# Patient Record
Sex: FEMALE | Race: WHITE | Employment: FULL TIME | URBAN - METROPOLITAN AREA
[De-identification: names, ages, dates, MRNs, and addresses within clinical notes are randomized per-mention and may not be internally consistent; named-entity substitution may affect disease eponyms.]

---

## 2021-02-05 ENCOUNTER — OFFICE VISIT (OUTPATIENT)
Dept: PRIMARY CARE CLINIC | Age: 46
End: 2021-02-05
Payer: COMMERCIAL

## 2021-02-05 VITALS
HEIGHT: 66 IN | DIASTOLIC BLOOD PRESSURE: 73 MMHG | HEART RATE: 80 BPM | TEMPERATURE: 98.2 F | WEIGHT: 289.8 LBS | OXYGEN SATURATION: 98 % | SYSTOLIC BLOOD PRESSURE: 145 MMHG | BODY MASS INDEX: 46.57 KG/M2

## 2021-02-05 DIAGNOSIS — E66.01 MORBID OBESITY WITH BMI OF 45.0-49.9, ADULT (HCC): ICD-10-CM

## 2021-02-05 DIAGNOSIS — Z13.21 ENCOUNTER FOR VITAMIN DEFICIENCY SCREENING: ICD-10-CM

## 2021-02-05 DIAGNOSIS — Z76.89 ENCOUNTER TO ESTABLISH CARE: Primary | ICD-10-CM

## 2021-02-05 DIAGNOSIS — Z00.01 ANNUAL VISIT FOR GENERAL ADULT MEDICAL EXAMINATION WITH ABNORMAL FINDINGS: ICD-10-CM

## 2021-02-05 DIAGNOSIS — Z23 ENCOUNTER FOR IMMUNIZATION: ICD-10-CM

## 2021-02-05 DIAGNOSIS — Z11.4 SCREENING FOR HIV (HUMAN IMMUNODEFICIENCY VIRUS): ICD-10-CM

## 2021-02-05 DIAGNOSIS — R07.9 CHEST PAIN, UNSPECIFIED TYPE: ICD-10-CM

## 2021-02-05 DIAGNOSIS — C92.01 AML (ACUTE MYELOID LEUKEMIA) IN REMISSION (HCC): ICD-10-CM

## 2021-02-05 DIAGNOSIS — Z12.31 ENCOUNTER FOR SCREENING MAMMOGRAM FOR MALIGNANT NEOPLASM OF BREAST: ICD-10-CM

## 2021-02-05 DIAGNOSIS — R03.0 ELEVATED BP WITHOUT DIAGNOSIS OF HYPERTENSION: ICD-10-CM

## 2021-02-05 DIAGNOSIS — Z11.59 NEED FOR HEPATITIS C SCREENING TEST: ICD-10-CM

## 2021-02-05 PROBLEM — Z87.891 HISTORY OF TOBACCO USE: Status: ACTIVE | Noted: 2021-02-05

## 2021-02-05 PROCEDURE — 99204 OFFICE O/P NEW MOD 45 MIN: CPT | Performed by: FAMILY MEDICINE

## 2021-02-05 PROCEDURE — 90715 TDAP VACCINE 7 YRS/> IM: CPT | Performed by: FAMILY MEDICINE

## 2021-02-05 PROCEDURE — 99386 PREV VISIT NEW AGE 40-64: CPT | Performed by: FAMILY MEDICINE

## 2021-02-05 PROCEDURE — 96160 PT-FOCUSED HLTH RISK ASSMT: CPT | Performed by: FAMILY MEDICINE

## 2021-02-05 PROCEDURE — 90471 IMMUNIZATION ADMIN: CPT | Performed by: FAMILY MEDICINE

## 2021-02-05 RX ORDER — AA/PROT/LYSINE/METHIO/VIT C/B6 50-12.5 MG
10 TABLET ORAL DAILY
COMMUNITY

## 2021-02-05 RX ORDER — UREA 10 %
100 LOTION (ML) TOPICAL DAILY
COMMUNITY

## 2021-02-05 NOTE — PATIENT INSTRUCTIONS
Starting a Weight Loss Plan: Care Instructions Your Care Instructions If you are thinking about losing weight, it can be hard to know where to start. Your doctor can help you set up a weight loss plan that best meets your needs. You may want to take a class on nutrition or exercise, or join a weight loss support group. If you have questions about how to make changes to your eating or exercise habits, ask your doctor about seeing a registered dietitian or an exercise specialist. 
It can be a big challenge to lose weight. But you do not have to make huge changes at once. Make small changes, and stick with them. When those changes become habit, add a few more changes. If you do not think you are ready to make changes right now, try to pick a date in the future. Make an appointment to see your doctor to discuss whether the time is right for you to start a plan. Follow-up care is a key part of your treatment and safety. Be sure to make and go to all appointments, and call your doctor if you are having problems. It's also a good idea to know your test results and keep a list of the medicines you take. How can you care for yourself at home? · Set realistic goals. Many people expect to lose much more weight than is likely. A weight loss of 5% to 10% of your body weight may be enough to improve your health. · Get family and friends involved to provide support. Talk to them about why you are trying to lose weight, and ask them to help. They can help by participating in exercise and having meals with you, even if they may be eating something different. · Find what works best for you. If you do not have time or do not like to cook, a program that offers meal replacement bars or shakes may be better for you. Or if you like to prepare meals, finding a plan that includes daily menus and recipes may be best. 
· Ask your doctor about other health professionals who can help you achieve your weight loss goals. ? A dietitian can help you make healthy changes in your diet. ? An exercise specialist or  can help you develop a safe and effective exercise program. 
? A counselor or psychiatrist can help you cope with issues such as depression, anxiety, or family problems that can make it hard to focus on weight loss. · Consider joining a support group for people who are trying to lose weight. Your doctor can suggest groups in your area. Where can you learn more? Go to http://www.gray.com/ Enter V479 in the search box to learn more about \"Starting a Weight Loss Plan: Care Instructions. \" Current as of: December 11, 2019               Content Version: 12.6 © 6685-9635 [employer]. Care instructions adapted under license by [employer] (which disclaims liability or warranty for this information). If you have questions about a medical condition or this instruction, always ask your healthcare professional. [address] any warranty or liability for your use of this information.

## 2021-02-05 NOTE — PROGRESS NOTES
Many concerns. Blood work, would like to check sugar levels. Cancer survivor. Having some chest discomfort at times. Would like info weight lose.

## 2021-02-05 NOTE — PROGRESS NOTES
HPI     Chief Complaint: establish care and has multiple complaints. Rebekah Gentile is an 41 y.o. female. Patient previously was seen here but has been several years since she has had follow-up. She wants to reestablish care and and wants to catch up on health maintenance including her annual physical.      Acute issues  Chest pain, intermittent and stabbing in nature. Randomly happens. Goes away after 15-20 minutes. Not associated with diaphoresis. Can exercise without chest pain. Patient also wants to have labs checked. 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 She states she has been cleared by hematology after close follow-up for 5 years. She has no reported fatigue. She is concerned for diabetes. Would like to have labs checked. She does report eating a lot of sugar. She is concerned about her weight. Recently started exercising. Diet and exercise: Exercises by doing the treadmill a few times a week. Diet \"bad\". Admits a lot of sugar. Gyn Care overdue  She has never had a mammogram.      Health Maintenance reviewed -  HIV testing discussed today  Hepatitis C testing discussed today  Last Mammogram: never  Last TDap: unknown    Social History  Denies smoking currently. Quit November 2018. Smoked 1ppd for 30 years. EtOH rarely. No elicit drug use     Family History  No FH of breast cancer  No FH of colon cancer     Current medications include:   Current Outpatient Medications   Medication Sig    fish oil-omega-3 fatty acids 340-1,000 mg capsule Take 1 Cap by mouth daily.  therapeutic multivitamin-minerals (THERAGRAN-M) tablet Take 1 Tab by mouth daily.  cyanocobalamin (Vitamin B-12) 100 mcg tablet Take 100 mcg by mouth daily.  docusate sodium (COLACE) 50 mg capsule Take 50 mg by mouth daily.  coenzyme q10 10 mg cap Take 10 mg by mouth daily.  loratadine (CLARITIN REDITABS) 10 mg dissolvable tablet Take 10 mg by mouth daily. No current facility-administered medications for this visit. Allergies - reviewed: Allergies   Allergen Reactions    Prednisone Anxiety         Past Medical History - reviewed:  Past Medical History:   Diagnosis Date    Cancer ([address]     Headache          Past Surgical History - reviewed:  History reviewed. No pertinent surgical history. Family History - reviewed:  History reviewed. No pertinent family history. Social History - reviewed:  Social History     Socioeconomic History    Marital status:      Spouse name: Not on file    Number of children: Not on file    Years of education: Not on file    Highest education level: Not on file   Occupational History    Not on file   Social Needs    Financial resource strain: Not on file    Food insecurity     Worry: Not on file     Inability: Not on file    Transportation needs     Medical: Not on file     Non-medical: Not on file   Tobacco Use    Smoking status: Former Smoker     Start date: 2018    Smokeless tobacco: Never Used   Substance and Sexual Activity    Alcohol use: No    Drug use: No    Sexual activity: Not on file   Lifestyle    Physical activity     Days per week: Not on file     Minutes per session: Not on file    Stress: Not on file   Relationships    Social connections     Talks on phone: Not on file     Gets together: Not on file     Attends Synagogue service: Not on file     Active member of club or organization: Not on file     Attends meetings of clubs or organizations: Not on file     Relationship status: Not on file    Intimate partner violence     Fear of current or ex partner: Not on file     Emotionally abused: Not on file     Physically abused: Not on file     Forced sexual activity: Not on file   Other Topics Concern    Not on file   Social History Narrative    Not on file         Immunizations - reviewed:    There is no immunization history for the selected administration types on file for this patient. Review of Systems   Review of Systems   Constitutional: Negative for chills and fever. HENT: Negative for congestion and sore throat. Eyes: Negative for blurred vision and redness. Respiratory: Negative for cough and shortness of breath. Cardiovascular: Positive for chest pain. Negative for palpitations and leg swelling. Gastrointestinal: Negative for abdominal pain, blood in stool and vomiting. Genitourinary: Negative for dysuria and hematuria. Musculoskeletal: Negative for falls and myalgias. Skin: Negative for rash. Neurological: Negative for dizziness, focal weakness and headaches. Endo/Heme/Allergies: Negative for polydipsia. Does not bruise/bleed easily. Psychiatric/Behavioral: Negative for substance abuse and suicidal ideas. Reviewed PmHx, FmHx, SocHx as well as meds and allergies, updated and dated in the chart. Objective     Visit Vitals  BP (!) 145/73 (BP 1 Location: Left upper arm, BP Patient Position: Sitting, BP Cuff Size: Large adult)   Pulse 80   Temp 98.2 °F (36.8 °C) (Temporal)   Ht 5' 6\" (1.676 m)   Wt 289 lb 12.8 oz (131.5 kg)   SpO2 98%   BMI 46.77 kg/m²       Physical Exam  Vitals signs and nursing note reviewed. Constitutional:       General: She is not in acute distress. Appearance: Normal appearance. HENT:      Head: Normocephalic and atraumatic. Right Ear: External ear normal.      Left Ear: External ear normal.      Nose: Nose normal. No congestion. Mouth/Throat:      Mouth: Mucous membranes are moist.      Pharynx: No oropharyngeal exudate or posterior oropharyngeal erythema. Eyes:      Extraocular Movements: Extraocular movements intact. Conjunctiva/sclera: Conjunctivae normal.      Pupils: Pupils are equal, round, and reactive to light. Neck:      Musculoskeletal: Normal range of motion and neck supple. Cardiovascular:      Rate and Rhythm: Normal rate and regular rhythm. Pulses: Normal pulses.       Heart sounds: Normal heart sounds. No murmur. Comments: No tenderness to palpation of anterior chest wall. Pulmonary:      Effort: Pulmonary effort is normal. No respiratory distress. Breath sounds: Normal breath sounds. No rales. Abdominal:      General: There is no distension. Palpations: Abdomen is soft. Musculoskeletal: Normal range of motion. Skin:     General: Skin is warm and dry. Coloration: Skin is not jaundiced. Neurological:      General: No focal deficit present. Mental Status: She is alert and oriented to person, place, and time. Psychiatric:         Mood and Affect: Mood normal.         Behavior: Behavior normal.     EKG: Personally reviewed. Normal sinus rhythm without any ST changes. Assessment and Plan     Diagnoses and all orders for this visit:    1. Encounter to establish care  -     CBC WITH AUTOMATED DIFF  -     METABOLIC PANEL, COMPREHENSIVE  -     HEMOGLOBIN A1C WITH EAG  -     SC PT-FOCUSED HLTH RISK ASSMT SCORE DOC STND INSTRM    2. Annual visit for general adult medical examination with abnormal findings  Comments:  Catching up HM. Will return for fasting labs and pap smear. 3. Chest pain, unspecified type  Comments:  EKG NSR. Referring to Cardiology and checking TSH. Orders:  -     AMB POC EKG ROUTINE W/ 12 LEADS, INTER & REP  -     TSH RFX ON ABNORMAL TO FREE T4  -     REFERRAL TO CARDIOLOGY    4. Morbid obesity with BMI of 45.0-49.9, adult ([address]  Comments:  Counseled on healthy diet and lifestyle  Orders:  -     TSH RFX ON ABNORMAL TO FREE T4  -     METABOLIC PANEL, COMPREHENSIVE  -     HEMOGLOBIN A1C WITH EAG    5. AML (acute myeloid leukemia) in remission [facility])  Comments: In remission per patient. Orders:  -     CBC WITH AUTOMATED DIFF    6. Elevated BP without diagnosis of hypertension  Comments:  Elevated today. Will monitor at next visit. Start antihypertensive if needed.      7. Encounter for screening mammogram for malignant neoplasm of breast  -     [facility] MAMMO BI SCREENING INCL CAD; Future    8. Need for hepatitis C screening test  -     HEPATITIS C AB    9. Screening for HIV (human immunodeficiency virus)  -     HIV 1/2 AG/AB, 4TH GENERATION,W RFLX CONFIRM    10. Encounter for vitamin deficiency screening  -     VITAMIN D, 25 HYDROXY    11. Encounter for immunization  -     TETANUS, DIPHTHERIA TOXOIDS AND ACELLULAR PERTUSSIS VACCINE (TDAP), IN INDIVIDS. >=7, IM         Follow-up and Dispositions    · Return in about 1 month (around 3/5/2021) for Pap smear. Follow-up and Disposition History          I discussed the aforementioned diagnoses with the patient as well as the plan of care. All questions were answered and an AVS was provided.        Benito Campos MD  [facility]  [id number], [place], [address] [place] [address]

## 2021-02-06 LAB
25(OH)D3+25(OH)D2 SERPL-MCNC: 25.1 NG/ML (ref 30–100)
ALBUMIN SERPL-MCNC: 3.7 G/DL (ref 3.8–4.8)
ALBUMIN/GLOB SERPL: 1.4 {RATIO} (ref 1.2–2.2)
ALP SERPL-CCNC: 83 IU/L (ref 39–117)
ALT SERPL-CCNC: 17 IU/L (ref 0–32)
AST SERPL-CCNC: 12 IU/L (ref 0–40)
BASOPHILS # BLD AUTO: 0.1 X10E3/UL (ref 0–0.2)
BASOPHILS NFR BLD AUTO: 1 %
BILIRUB SERPL-MCNC: <0.2 MG/DL (ref 0–1.2)
BUN SERPL-MCNC: 14 MG/DL (ref 6–24)
BUN/CREAT SERPL: 16 (ref 9–23)
CALCIUM SERPL-MCNC: 8.9 MG/DL (ref 8.7–10.2)
CHLORIDE SERPL-SCNC: 105 MMOL/L (ref 96–106)
CO2 SERPL-SCNC: 26 MMOL/L (ref 20–29)
CREAT SERPL-MCNC: 0.85 MG/DL (ref 0.57–1)
EOSINOPHIL # BLD AUTO: 0.5 X10E3/UL (ref 0–0.4)
EOSINOPHIL NFR BLD AUTO: 5 %
ERYTHROCYTE [DISTWIDTH] IN BLOOD BY AUTOMATED COUNT: 13.1 % (ref 11.7–15.4)
EST. AVERAGE GLUCOSE BLD GHB EST-MCNC: 128 MG/DL
GLOBULIN SER CALC-MCNC: 2.6 G/DL (ref 1.5–4.5)
GLUCOSE SERPL-MCNC: 93 MG/DL (ref 65–99)
HBA1C MFR BLD: 6.1 % (ref 4.8–5.6)
HCT VFR BLD AUTO: 37.1 % (ref 34–46.6)
HCV AB S/CO SERPL IA: <0.1 S/CO RATIO (ref 0–0.9)
HGB BLD-MCNC: 12.9 G/DL (ref 11.1–15.9)
HIV 1+2 AB+HIV1 P24 AG SERPL QL IA: NON REACTIVE
IMM GRANULOCYTES # BLD AUTO: 0 X10E3/UL (ref 0–0.1)
IMM GRANULOCYTES NFR BLD AUTO: 0 %
LYMPHOCYTES # BLD AUTO: 4 X10E3/UL (ref 0.7–3.1)
LYMPHOCYTES NFR BLD AUTO: 39 %
MCH RBC QN AUTO: 30.3 PG (ref 26.6–33)
MCHC RBC AUTO-ENTMCNC: 34.8 G/DL (ref 31.5–35.7)
MCV RBC AUTO: 87 FL (ref 79–97)
MONOCYTES # BLD AUTO: 0.6 X10E3/UL (ref 0.1–0.9)
MONOCYTES NFR BLD AUTO: 6 %
NEUTROPHILS # BLD AUTO: 5.1 X10E3/UL (ref 1.4–7)
NEUTROPHILS NFR BLD AUTO: 49 %
PLATELET # BLD AUTO: 365 X10E3/UL (ref 150–450)
POTASSIUM SERPL-SCNC: 4.1 MMOL/L (ref 3.5–5.2)
PROT SERPL-MCNC: 6.3 G/DL (ref 6–8.5)
RBC # BLD AUTO: 4.26 X10E6/UL (ref 3.77–5.28)
SODIUM SERPL-SCNC: 142 MMOL/L (ref 134–144)
T4 FREE SERPL-MCNC: 1 NG/DL (ref 0.82–1.77)
TSH SERPL DL<=0.005 MIU/L-ACNC: 4.58 UIU/ML (ref 0.45–4.5)
WBC # BLD AUTO: 10.3 X10E3/UL (ref 3.4–10.8)

## 2021-02-08 PROBLEM — E03.8 SUBCLINICAL HYPOTHYROIDISM: Status: ACTIVE | Noted: 2021-02-08

## 2021-02-08 PROBLEM — R73.03 PRE-DIABETES: Status: ACTIVE | Noted: 2021-02-08

## 2021-02-08 NOTE — PROGRESS NOTES
Please call Lillie Paulson regarding their results which are abnormal.    Her blood counts are within acceptable range. Recommend she keeps follow up with her Heme/Onc doc given hx of cancer. TSH thyroid hormone minimally elevated but overall circulating thyroid hormone is normal.    Vitamin D level is low. She should start taking 1000IU daily OTC. A1c shows pre diabetes. The good news is this is often reversible through diet and exercise so we want to work on this now before it gets worse. Be mindful of sugar intake, which can be found in sweetened drinks and desserts, but it is also found in carbohydrates such as potatoes, bread, and rice; so sweet potatoes, whole grain breads, and brown rice in moderation are better options. Please incorporate exercise, at least 30 minutes a day, 5 days a week. Maintaining a healthy weight helps to regulate your blood sugars. We will monitor all of these on future labs.      Enedelia Davis MD

## 2021-02-09 NOTE — PROGRESS NOTES
Called and talked with patient regarding bw. Will start Vit D 1000iu daily. Talked about diet and exercise. Will f/u as needed.  --Alisia Manriquez

## 2021-02-26 ENCOUNTER — HOSPITAL ENCOUNTER (OUTPATIENT)
Dept: MAMMOGRAPHY | Age: 46
Discharge: HOME | End: 2021-02-26
Attending: FAMILY MEDICINE
Payer: COMMERCIAL

## 2021-02-26 DIAGNOSIS — Z12.31 ENCOUNTER FOR SCREENING MAMMOGRAM FOR MALIGNANT NEOPLASM OF BREAST: ICD-10-CM

## 2021-02-26 PROCEDURE — 77067 SCR MAMMO BI INCL CAD: CPT

## 2021-03-01 ENCOUNTER — TRANSCRIBE ORDER (OUTPATIENT)
Dept: SCHEDULING | Age: 46
End: 2021-03-01

## 2021-03-01 DIAGNOSIS — R92.8 ABNORMAL MAMMOGRAM: Primary | ICD-10-CM

## 2021-03-04 ENCOUNTER — OFFICE VISIT (OUTPATIENT)
Dept: PRIMARY CARE CLINIC | Age: 46
End: 2021-03-04
Payer: COMMERCIAL

## 2021-03-04 VITALS
SYSTOLIC BLOOD PRESSURE: 142 MMHG | TEMPERATURE: 97.6 F | WEIGHT: 287 LBS | HEART RATE: 85 BPM | OXYGEN SATURATION: 97 % | BODY MASS INDEX: 46.12 KG/M2 | DIASTOLIC BLOOD PRESSURE: 68 MMHG | HEIGHT: 66 IN

## 2021-03-04 DIAGNOSIS — Z01.419 ENCOUNTER FOR GYNECOLOGICAL EXAMINATION WITHOUT ABNORMAL FINDING: Primary | ICD-10-CM

## 2021-03-04 DIAGNOSIS — R92.8 ABNORMAL MAMMOGRAM: ICD-10-CM

## 2021-03-04 DIAGNOSIS — Z13.220 SCREENING CHOLESTEROL LEVEL: ICD-10-CM

## 2021-03-04 PROCEDURE — 99212 OFFICE O/P EST SF 10 MIN: CPT | Performed by: FAMILY MEDICINE

## 2021-03-04 RX ORDER — ACETAMINOPHEN 500 MG
TABLET ORAL DAILY
COMMUNITY

## 2021-03-04 NOTE — PROGRESS NOTES
HPI     Chief Complaint   Patient presents with    Physical     PAP. Fasting blood work        Alba Cedeno is a 41 y.o. female presenting for pap smear. She had annual exam about one month ago. Labs completed at that time, notable for Vit D Deficiency. She was not fasting previously, so she needs her lipid checked. Previously had chest pain, but none since then. Has not set up cardiology follow up yet. Had mammogram which showed asymmetry of left breast, has diagnostic mammogram scheduled. Allergies- reviewed  Allergies   Allergen Reactions    Prednisone Anxiety       Medications- reviewed  Current Outpatient Medications   Medication Sig    cholecalciferol (VITAMIN D3) (2,000 UNITS /50 MCG) cap capsule Take  by mouth daily.  fish oil-omega-3 fatty acids 340-1,000 mg capsule Take 1 Cap by mouth daily.  therapeutic multivitamin-minerals (THERAGRAN-M) tablet Take 1 Tab by mouth daily.  cyanocobalamin (Vitamin B-12) 100 mcg tablet Take 100 mcg by mouth daily.  docusate sodium (COLACE) 50 mg capsule Take 50 mg by mouth daily.  coenzyme q10 10 mg cap Take 10 mg by mouth daily.  loratadine (CLARITIN REDITABS) 10 mg dissolvable tablet Take 10 mg by mouth daily. No current facility-administered medications for this visit. Immunizations - reviewed:   Immunization History   Administered Date(s) Administered    Tdap 02/05/2021     Flu: recommended every fall. Review of Systems   Review of Systems   Constitutional: Negative for fever. Eyes: Negative for redness. Gastrointestinal: Negative for abdominal pain and blood in stool. Genitourinary: Negative for dysuria and hematuria. Musculoskeletal: Negative for myalgias. Reviewed PmHx, FmHx, SocHx as well as meds and allergies, updated and dated in the chart.        Objective     Visit Vitals  BP (!) 142/68 (BP 1 Location: Right upper arm, BP Patient Position: Sitting, BP Cuff Size: Large adult)   Pulse 85   Temp 97.6 °F (36.4 °C) (Temporal)   Ht 5' 6\" (1.676 m)   Wt 287 lb (130.2 kg)   LMP 02/09/2021   SpO2 97%   BMI 46.32 kg/m²       Physical Exam   Breast -breasts appear normal, no suspicious masses palpated, no skin or nipple changes or axillary nodes. Pelvic - Exam chaperoned by Ila Reynaga LPN. External genitalia normal without rashes or lesions. Pink and moist vaginal mucosa. Scant white discharge. Cervix without lesions or abnormal discharge. Uterus non tender and normal size. No adnexal masses or tenderness. Assessment and Plan     Diagnoses and all orders for this visit:    1. Encounter for gynecological examination without abnormal finding  -     OBTAINING SCREEN PAP SMEAR  -     PAP IG, APTIMA HPV AND RFX 16/18,45 ([id number])    2. Screening cholesterol level  -     LIPID PANEL    3. Abnormal mammogram  Comments: Follow up diagnostic mammogram       Follow-up and Dispositions    · Return in about 6 months (around 9/4/2021) for chronic follow up. I discussed the aforementioned diagnoses with the patient as well as the plan of care. All questions were answered and an AVS was provided.        Yonny Anderson MD  [address] [place] [address]

## 2021-03-04 NOTE — PATIENT INSTRUCTIONS
Learning About Vitamin D Why is it important to get enough vitamin D? Your body needs vitamin D to absorb calcium. Calcium keeps your bones and muscles, including your heart, healthy and strong. If your muscles don't get enough calcium, they can cramp, hurt, or feel weak. You may have long-term (chronic) muscle aches and pains. If you don't get enough vitamin D throughout life, you have an increased chance of having thin and brittle bones (osteoporosis) in your later years. Children who don't get enough vitamin D may not grow as much as others their age. They also have a chance of getting a rare disease called rickets. It causes weak bones. Vitamin D and calcium are added to many foods. And your body uses sunshine to make its own vitamin D. How much vitamin D do you need? The [place] of Medicine recommends that people ages 3 through 79 get 600 IU (international units) every day. Adults 71 and older need 800 IU every day. Blood tests for vitamin D can check your vitamin D level. But there is no standard normal range used by all laboratories. You're likely getting enough vitamin D if your levels are in the range of 20 to 50 ng/mL. How can you get more vitamin D? Foods that contain vitamin D include: 
· [place], tuna, and mackerel. These are some of the best foods to eat when you need to get more vitamin D. 
· Cheese, egg yolks, and beef liver. These foods have vitamin D in small amounts. · Milk, soy drinks, orange juice, yogurt, margarine, and some kinds of cereal have vitamin D added to them. Some people don't make vitamin D as well as others. They may have to take extra care in getting enough vitamin D. Things that reduce how much vitamin D your body makes include: · Dark skin, such as many  Americans have. · Age, especially if you are older than 72. · Digestive problems, such as Crohn's or celiac disease. · Liver and kidney disease. Some people who do not get enough vitamin D may need supplements. Are there any risks from taking vitamin D? 
· Too much vitamin D: 
? Can damage your kidneys. ? Can cause nausea and vomiting, constipation, and weakness. ? Raises the amount of calcium in your blood. If this happens, you can get confused or have an irregular heart rhythm. · Vitamin D may interact with other medicines. Tell your doctor about all of the medicines you take, including over-the-counter drugs, herbs, and pills. Tell your doctor about all of your current medical problems. Where can you learn more? Go to http://www.[employer]/ Enter [id number] in the search box to learn more about \"Learning About Vitamin D.\" 
Current as of: August 22, 2019               Content Version: 12.6 © 2666-8674 [employer]. Care instructions adapted under license by [employer] (which disclaims liability or warranty for this information). If you have questions about a medical condition or this instruction, always ask your healthcare professional. [address] any warranty or liability for your use of this information.

## 2021-03-05 LAB
CHOLEST SERPL-MCNC: 167 MG/DL (ref 100–199)
HDLC SERPL-MCNC: 43 MG/DL
LDLC SERPL CALC-MCNC: 108 MG/DL (ref 0–99)
TRIGL SERPL-MCNC: 88 MG/DL (ref 0–149)
VLDLC SERPL CALC-MCNC: 16 MG/DL (ref 5–40)

## 2021-03-09 LAB
CYTOLOGIST CVX/VAG CYTO: NORMAL
CYTOLOGY CVX/VAG DOC CYTO: NORMAL
CYTOLOGY CVX/VAG DOC THIN PREP: NORMAL
DX ICD CODE: NORMAL
HPV I/H RISK 4 DNA CVX QL PROBE+SIG AMP: NEGATIVE
Lab: NORMAL
OTHER STN SPEC: NORMAL
STAT OF ADQ CVX/VAG CYTO-IMP: NORMAL

## 2021-03-15 ENCOUNTER — HOSPITAL ENCOUNTER (OUTPATIENT)
Dept: MAMMOGRAPHY | Age: 46
Discharge: HOME | End: 2021-03-15
Attending: FAMILY MEDICINE
Payer: COMMERCIAL

## 2021-03-15 ENCOUNTER — TRANSCRIBE ORDER (OUTPATIENT)
Dept: MAMMOGRAPHY | Age: 46
End: 2021-03-15

## 2021-03-15 DIAGNOSIS — R92.8 ABNORMALITY OF LEFT BREAST ON SCREENING MAMMOGRAM: ICD-10-CM

## 2021-03-15 DIAGNOSIS — R92.8 ABNORMAL MAMMOGRAM: ICD-10-CM

## 2021-03-15 DIAGNOSIS — R92.8 ABNORMALITY OF LEFT BREAST ON SCREENING MAMMOGRAM: Primary | ICD-10-CM

## 2021-03-15 DIAGNOSIS — R92.8 FOLLOW-UP EXAMINATION OF ABNORMAL MAMMOGRAM: Primary | ICD-10-CM

## 2021-03-15 PROCEDURE — 76642 ULTRASOUND BREAST LIMITED: CPT

## 2021-03-15 PROCEDURE — 77061 BREAST TOMOSYNTHESIS UNI: CPT

## 2021-05-18 NOTE — PROGRESS NOTES
HPI     Chief Complaint   Patient presents with    Follow-up     Flexeril refill and lump on right side of forehead. HPI:  Ash Rodriguez is a 41 y.o. female who is compliant with her medications and tolerates them well. Back pain: intermittent. Exacerbated by a lot of back tension or activities. No radiculopathy inh legs. Morbid Obesity: labs previously showed prediabetes. Patient diet needs improvement. Has not been on meds before for weight. Drinks a lot of tea and some sodas. Eats a lot of bread. Bump on head: 2 years ago, came out of nowhere, over right eye. Not tender. Review of Systems   Constitutional: Negative for malaise/fatigue. Cardiovascular: Negative for chest pain and palpitations. Musculoskeletal: Positive for back pain and myalgias. Psychiatric/Behavioral: Negative for depression and suicidal ideas. Reviewed PmHx, FmHx, SocHx as well as meds and allergies, updated and dated in the chart. Current Outpatient Medications on File Prior to Visit   Medication Sig Dispense Refill    magnesium 250 mg tab Take  by mouth.  cholecalciferol (VITAMIN D3) (2,000 UNITS /50 MCG) cap capsule Take  by mouth daily.  fish oil-omega-3 fatty acids 340-1,000 mg capsule Take 1 Cap by mouth daily.  therapeutic multivitamin-minerals (THERAGRAN-M) tablet Take 1 Tab by mouth daily.  cyanocobalamin (Vitamin B-12) 100 mcg tablet Take 100 mcg by mouth daily.  docusate sodium (COLACE) 50 mg capsule Take 50 mg by mouth daily.  coenzyme q10 10 mg cap Take 10 mg by mouth daily.  loratadine (CLARITIN REDITABS) 10 mg dissolvable tablet Take 10 mg by mouth daily. No current facility-administered medications on file prior to visit. Requested Prescriptions     Signed Prescriptions Disp Refills    liraglutide (VICTOZA) 0.6 mg/0.1 mL (18 mg/3 mL) pnij 1 Adjustable Dose Pre-filled Pen Syringe 0     Sig: Inject 0.1ml subcutaneously daily for one week.  Then increase to 1.2mg daily for one week. Then increase to 1.8mg daily for one week.  naproxen (NAPROSYN) 375 mg tablet 30 Tablet 0     Sig: Take 1 Tablet by mouth two (2) times daily as needed for Pain.  cyclobenzaprine (FLEXERIL) 5 mg tablet 40 Tablet 0     Sig: Take 1 Tablet by mouth two (2) times daily as needed for Muscle Spasm(s). Objective     Visit Vitals  /64 (BP 1 Location: Right upper arm, BP Patient Position: Sitting, BP Cuff Size: Large adult)   Pulse 84   Temp 97.4 °F (36.3 °C) (Temporal)   Resp 18   Ht 5' 6\" (1.676 m)   Wt 289 lb 3.2 oz (131.2 kg)   SpO2 98%   BMI 46.68 kg/m²     Physical Exam  Vitals and nursing note reviewed. Constitutional:       Appearance: Normal appearance. HENT:      Head: Normocephalic and atraumatic. Right Ear: External ear normal.      Left Ear: External ear normal.   Eyes:      Extraocular Movements: Extraocular movements intact. Conjunctiva/sclera: Conjunctivae normal.   Cardiovascular:      Rate and Rhythm: Normal rate and regular rhythm. Heart sounds: Normal heart sounds. Pulmonary:      Effort: Pulmonary effort is normal. No respiratory distress. Breath sounds: Normal breath sounds. Musculoskeletal:         General: Normal range of motion. Comments: Gait normal. Tenderness in thoracic paraspinous muscles   Skin:     Comments: Small 0.5 soft mobile nodule above right brow. Non ntender   Neurological:      General: No focal deficit present. Mental Status: She is alert and oriented to person, place, and time. Assessment and Plan     Patient prediabetes and morbidly obese. Will try victoza. Wiill start with 0.6mg and titrate up at 0.6mg increments with goal of 3mg daily. Diagnoses and all orders for this visit:    1. Morbid obesity with BMI of 45.0-49.9, adult [facility]): long discussion regarding diet and exercise. Working on cutting back on sugars. Encourage increased activity. Will try victoza.   - liraglutide (VICTOZA) 0.6 mg/0.1 mL (18 mg/3 mL) pnij; Inject 0.1ml subcutaneously daily for one week. Then increase to 1.2mg daily for one week. Then increase to 1.8mg daily for one week. 2. Pre-diabetes  -     liraglutide (VICTOZA) 0.6 mg/0.1 mL (18 mg/3 mL) pnij; Inject 0.1ml subcutaneously daily for one week. Then increase to 1.2mg daily for one week. Then increase to 1.8mg daily for one week. 3. Back muscle spasm: chronic. May benefit from PT. Try NSAIDs first. Use flexeril sparingly  -     naproxen (NAPROSYN) 375 mg tablet; Take 1 Tablet by mouth two (2) times daily as needed for Pain. -     cyclobenzaprine (FLEXERIL) 5 mg tablet; Take 1 Tablet by mouth two (2) times daily as needed for Muscle Spasm(s). 4. Head lump- appears cystic.   -     REFERRAL TO PLASTIC SURGERY         Follow-up and Dispositions    · Return in about 1 month (around 6/20/2021) for weight follow up. Follow-up and Disposition History           I have discussed the diagnosis with the patient and the intended plan as seen in the above orders. The patient has received an after-visit summary and questions were answered concerning future plans. I have discussed medication side effects and warnings with the patient as well.       Ethan Rutherford MD  [address] [place] [address]

## 2021-05-18 NOTE — PATIENT INSTRUCTIONS
WEIGHT LOSS PLAN 1. Read food labels and count calories. To lose a pound in a week, you need to decrease your daily calorie intake by about 500 calories - that's one less dessert and a bag of chips every day. Some helpful online tools are: 
Www.loseit. [employer] Www.myeDiets.comnesspal.Moped Www.trend.ly Www.[employer]. gov Weight watchers mobile  
  
  
2. Increase water intake. Per [facility] Weight loss Program, it is important to drink 1/2 your body weight in ounces of water daily. Decrease your water consumption 2-3 hours before bedtime.  
  
3. Decrease sugary beverages (soda, energy drinks, fruit juices). Each can or glass of soda increases your risk of obesity by 60%. Can lose 10 pounds in a month by avoiding any soda. 12 oz can of soda = 140 calories, 16 oz cup of sweet tea = 200 calories 16 oz orange juice = 200 calories, 10 oz apple juice = 150 calories 32 oz sports drink = 200 calories, 16 oz punch = 240 calories 3.5 oz alcohol = 100-150 calories  
  
4. Avoid refined sugars and products containing high fructose corn syrup. This is a high concentration of empty calories (doesn't fill you up, just ups your daily calorie count). 
  
5. Exercise 30 minutes daily 5 days weekly, minimally. If you burn 3500 calories a week that equals a pound! Use a pedometer to count steps. Visit www. [employer]. [employer] for a free pedometer and diet recommendations.  
  
6. Decrease carbohydrates (white bread, pasta, rice, potatoes, sweet foods and sweet drinks like soda, tea, coffee, juice and sports drinks). Increase fiber and protein. Goal:  calories daily of carbohydrates  
  
7. Eat 3-5 small meals daily. (Breakfast, lunch, dinner with 2 healthy snacks)  
  
8. Get proper rest 7-8 hours uninterrupted. When you get less than 6 hours, it triggers hunger by affecting your Grehlin:Leptin ratio and this results in weight gain.  
  
9. Watch your food portions.  Green leafy vegetables should cover 1/2 of the plate, lean meat 1/4 of the plate, starchy vegetable 1/4 of the plate. Use smaller plates.  
  
10. Do not eat until you are full. Eat until you are no longer hungry. If you are not sure, try drinking a glass of water before getting your second serving of food.  
  
11. Weigh yourself often. Don't worry about mild fluctuation (5 lbs or so) but keep an eye on general trends.  It keeps you honest!

## 2021-05-20 ENCOUNTER — OFFICE VISIT (OUTPATIENT)
Dept: PRIMARY CARE CLINIC | Age: 46
End: 2021-05-20
Payer: COMMERCIAL

## 2021-05-20 VITALS
WEIGHT: 289.2 LBS | HEART RATE: 84 BPM | SYSTOLIC BLOOD PRESSURE: 139 MMHG | BODY MASS INDEX: 46.48 KG/M2 | HEIGHT: 66 IN | OXYGEN SATURATION: 98 % | RESPIRATION RATE: 18 BRPM | DIASTOLIC BLOOD PRESSURE: 64 MMHG | TEMPERATURE: 97.4 F

## 2021-05-20 DIAGNOSIS — R73.03 PRE-DIABETES: ICD-10-CM

## 2021-05-20 DIAGNOSIS — E66.01 MORBID OBESITY WITH BMI OF 45.0-49.9, ADULT (HCC): Primary | ICD-10-CM

## 2021-05-20 DIAGNOSIS — R22.0 HEAD LUMP: ICD-10-CM

## 2021-05-20 DIAGNOSIS — M62.830 BACK MUSCLE SPASM: ICD-10-CM

## 2021-05-20 PROCEDURE — 99215 OFFICE O/P EST HI 40 MIN: CPT | Performed by: FAMILY MEDICINE

## 2021-05-20 RX ORDER — CYCLOBENZAPRINE HCL 5 MG
5 TABLET ORAL
Qty: 40 TABLET | Refills: 0 | Status: SHIPPED | OUTPATIENT
Start: 2021-05-20 | End: 2022-04-22 | Stop reason: SDUPTHER

## 2021-05-20 RX ORDER — NAPROXEN 375 MG/1
375 TABLET ORAL
Qty: 30 TABLET | Refills: 0 | Status: SHIPPED | OUTPATIENT
Start: 2021-05-20

## 2021-05-20 RX ORDER — ZINC GLUCONATE 10 MG
LOZENGE ORAL
COMMUNITY

## 2021-05-20 NOTE — PROGRESS NOTES
Visit Vitals  /64 (BP 1 Location: Right upper arm, BP Patient Position: Sitting, BP Cuff Size: Large adult)   Pulse 84   Temp 97.4 °F (36.3 °C) (Temporal)   Resp 18   Ht 5' 6\" (1.676 m)   Wt 289 lb 3.2 oz (131.2 kg)   SpO2 98%   BMI 46.68 kg/m²     Chief Complaint   Patient presents with    Follow-up     Flexeril  refill and lump on right side of forehead. 1. Have you been to the ER, urgent care clinic since your last visit? Hospitalized since your last visit? No    2. Have you seen or consulted any other health care providers outside of the [address] [place] [address] since your last visit? Include any pap smears or colon screening.  No

## 2021-09-17 ENCOUNTER — HOSPITAL ENCOUNTER (OUTPATIENT)
Dept: MAMMOGRAPHY | Age: 46
Discharge: HOME | End: 2021-09-17
Payer: COMMERCIAL

## 2021-09-17 DIAGNOSIS — R92.8 ABNORMAL MAMMOGRAM OF LEFT BREAST: Primary | ICD-10-CM

## 2021-09-17 DIAGNOSIS — Z12.31 ENCOUNTER FOR SCREENING MAMMOGRAM FOR BREAST CANCER: ICD-10-CM

## 2021-09-17 DIAGNOSIS — R92.8 FOLLOW-UP EXAMINATION OF ABNORMAL MAMMOGRAM: ICD-10-CM

## 2021-09-17 PROCEDURE — 77061 BREAST TOMOSYNTHESIS UNI: CPT

## 2021-09-17 NOTE — PROGRESS NOTES
Called patient. No answer, VM left. Focal left breast asymmetry noted again. Rec close follow up again. Follow up in 6 months, will also be due for regular screening mammogram on the right. Both orders placed to be done at same time.    TW

## 2022-02-24 ENCOUNTER — OFFICE VISIT (OUTPATIENT)
Dept: PRIMARY CARE CLINIC | Age: 47
End: 2022-02-24
Payer: COMMERCIAL

## 2022-02-24 VITALS
BODY MASS INDEX: 46.99 KG/M2 | RESPIRATION RATE: 20 BRPM | DIASTOLIC BLOOD PRESSURE: 80 MMHG | WEIGHT: 292.4 LBS | TEMPERATURE: 97.4 F | HEIGHT: 66 IN | SYSTOLIC BLOOD PRESSURE: 136 MMHG | HEART RATE: 63 BPM | OXYGEN SATURATION: 98 %

## 2022-02-24 DIAGNOSIS — F41.1 GAD (GENERALIZED ANXIETY DISORDER): ICD-10-CM

## 2022-02-24 DIAGNOSIS — K21.9 GASTROESOPHAGEAL REFLUX DISEASE WITHOUT ESOPHAGITIS: Primary | ICD-10-CM

## 2022-02-24 DIAGNOSIS — E66.01 MORBID OBESITY WITH BMI OF 45.0-49.9, ADULT (HCC): ICD-10-CM

## 2022-02-24 PROCEDURE — 99214 OFFICE O/P EST MOD 30 MIN: CPT | Performed by: FAMILY MEDICINE

## 2022-02-24 RX ORDER — CITALOPRAM 20 MG/1
20 TABLET, FILM COATED ORAL DAILY
Qty: 30 TABLET | Refills: 5 | Status: SHIPPED | OUTPATIENT
Start: 2022-02-24 | End: 2022-09-29

## 2022-02-24 RX ORDER — SEMAGLUTIDE 1.34 MG/ML
0.25 INJECTION, SOLUTION SUBCUTANEOUS
Qty: 1 BOX | Refills: 2 | Status: SHIPPED | OUTPATIENT
Start: 2022-02-24

## 2022-02-24 RX ORDER — PANTOPRAZOLE SODIUM 20 MG/1
20 TABLET, DELAYED RELEASE ORAL DAILY
Qty: 30 TABLET | Refills: 5 | Status: SHIPPED | OUTPATIENT
Start: 2022-02-24 | End: 2022-09-29

## 2022-02-24 NOTE — PROGRESS NOTES
Visit Vitals  /80 (BP 1 Location: Left upper arm, BP Patient Position: Sitting, BP Cuff Size: Large adult)   Pulse 63   Temp 97.4 °F (36.3 °C) (Temporal)   Resp 20   Ht 5' 6\" (1.676 m)   Wt 292 lb 6.4 oz (132.6 kg)   SpO2 98%   BMI 47.19 kg/m²     Chief Complaint   Patient presents with    Follow-up     Anxiety, Reflex     1. Have you been to the ER, urgent care clinic since your last visit? Hospitalized since your last visit? No    2. Have you seen or consulted any other health care providers outside of the [address] [place] [address] since your last visit? Include any pap smears or colon screening.  No

## 2022-02-24 NOTE — PROGRESS NOTES
HPI     Chief Complaint   Patient presents with    Follow-up     Anxiety, Reflex       HPI:  Paulo Moreira is a 40 y.o. female who is compliant with her medications and tolerates them well. GERD: uses Tums almost nightly for years. She denies reflux is very mild but says dentist mentioned signs of reflux on dental exam.     COURTNEY: increased stress and tension for months. Has noticed increased agitation. Guilt causes increased crying spells. Denies HI/SI. Had been on celexa in the past for anxiety with good results. Morbid Obesity: labs previously showed prediabetes. Patient diet needs improvement and she eats a lot of breads and tea sweetened with splenda. She does not exercise but has a gym membership. Was motivated at one time but got out of the routine of exercising. Prediabetes: A1c 6.1%. Poor diet, not exercising. No polydipsia. Review of Systems   Cardiovascular: Negative for chest pain and palpitations. Gastrointestinal: Positive for heartburn. Negative for abdominal pain. Musculoskeletal: Positive for back pain and myalgias. Endo/Heme/Allergies: Negative for polydipsia. Does not bruise/bleed easily. Psychiatric/Behavioral: Negative for depression and suicidal ideas. The patient is nervous/anxious. Reviewed PmHx, FmHx, SocHx as well as meds and allergies, updated and dated in the chart. Current Outpatient Medications on File Prior to Visit   Medication Sig Dispense Refill    magnesium 250 mg tab Take  by mouth.  naproxen (NAPROSYN) 375 mg tablet Take 1 Tablet by mouth two (2) times daily as needed for Pain. 30 Tablet 0    cyclobenzaprine (FLEXERIL) 5 mg tablet Take 1 Tablet by mouth two (2) times daily as needed for Muscle Spasm(s). 40 Tablet 0    [DISCONTINUED] liraglutide (VICTOZA) 0.6 mg/0.1 mL (18 mg/3 mL) pnij Inject 0.1ml subcutaneously daily for one week. Then increase to 1.2mg daily for one week. Then increase to 1.8mg daily for one week.  1 Adjustable Dose Pre-filled Pen Syringe 0    cholecalciferol (VITAMIN D3) (2,000 UNITS /50 MCG) cap capsule Take  by mouth daily.  fish oil-omega-3 fatty acids 340-1,000 mg capsule Take 1 Cap by mouth daily.  therapeutic multivitamin-minerals (THERAGRAN-M) tablet Take 1 Tab by mouth daily.  cyanocobalamin (Vitamin B-12) 100 mcg tablet Take 100 mcg by mouth daily.  docusate sodium (COLACE) 50 mg capsule Take 50 mg by mouth daily.  coenzyme q10 10 mg cap Take 10 mg by mouth daily.  loratadine (CLARITIN REDITABS) 10 mg dissolvable tablet Take 10 mg by mouth daily. No current facility-administered medications on file prior to visit. Requested Prescriptions     Signed Prescriptions Disp Refills    citalopram (CELEXA) 20 mg tablet 30 Tablet 5     Sig: Take 1 Tablet by mouth daily.  pantoprazole (PROTONIX) 20 mg tablet 30 Tablet 5     Sig: Take 1 Tablet by mouth daily.  semaglutide (Ozempic) 0.25 mg or 0.5 mg/dose (2 mg/1.5 ml) subq pen 1 Box 2     Si.25 mg by SubCUTAneous route every seven (7) days. Objective     Visit Vitals  /80 (BP 1 Location: Left upper arm, BP Patient Position: Sitting, BP Cuff Size: Large adult)   Pulse 63   Temp 97.4 °F (36.3 °C) (Temporal)   Resp 20   Ht 5' 6\" (1.676 m)   Wt 292 lb 6.4 oz (132.6 kg)   SpO2 98%   BMI 47.19 kg/m²     Physical Exam  Vitals and nursing note reviewed. Constitutional:       Appearance: Normal appearance. HENT:      Head: Normocephalic and atraumatic. Right Ear: External ear normal.      Left Ear: External ear normal.   Eyes:      Extraocular Movements: Extraocular movements intact. Conjunctiva/sclera: Conjunctivae normal.   Cardiovascular:      Rate and Rhythm: Normal rate and regular rhythm. Heart sounds: Normal heart sounds. Pulmonary:      Effort: Pulmonary effort is normal. No respiratory distress. Breath sounds: Normal breath sounds. Neurological:      General: No focal deficit present. Mental Status: She is alert and oriented to person, place, and time. Psychiatric:         Mood and Affect: Mood normal.         Behavior: Behavior normal.      Comments: Good eye contact. Speech linear and non pressured. Assessment and Plan     Diagnoses and all orders for this visit:    1. Gastroesophageal reflux disease without esophagitis  Comments:  Protonix 20mg daily. GERD friendly diet. No eating within 2 hours of bedtime. Orders:  -     pantoprazole (PROTONIX) 20 mg tablet; Take 1 Tablet by mouth daily. 2. COURTNEY (generalized anxiety disorder)  Comments:  Restarting Celexa. Orders:  -     citalopram (CELEXA) 20 mg tablet; Take 1 Tablet by mouth daily. 3. Morbid obesity with BMI of 45.0-49.9, adult ([address]  Comments: Will see if ozempic is covered. Increase activity, cut back on carbs. Try to do 15 minutes of cardio twice a day, most days of the week. Orders:  -     semaglutide (Ozempic) 0.25 mg or 0.5 mg/dose (2 mg/1.5 ml) subq pen; 0.25 mg by SubCUTAneous route every seven (7) days. Follow-up and Dispositions    · Return in about 2 months (around 4/24/2022) for anxiety follow up. Follow-up and Disposition History          I have discussed the diagnosis with the patient and the intended plan as seen in the above orders. The patient has received an after-visit summary and questions were answered concerning future plans. I have discussed medication side effects and warnings with the patient as well.       Harvey Green MD  [address] [place] [address]

## 2022-03-18 PROBLEM — C92.01 AML (ACUTE MYELOID LEUKEMIA) IN REMISSION (HCC): Status: ACTIVE | Noted: 2021-02-05

## 2022-03-19 PROBLEM — E66.01 MORBID OBESITY WITH BMI OF 45.0-49.9, ADULT (HCC): Status: ACTIVE | Noted: 2021-02-05

## 2022-03-19 PROBLEM — R92.8 ABNORMAL MAMMOGRAM: Status: ACTIVE | Noted: 2021-03-04

## 2022-03-19 PROBLEM — R73.03 PRE-DIABETES: Status: ACTIVE | Noted: 2021-02-08

## 2022-03-19 PROBLEM — Z87.891 HISTORY OF TOBACCO USE: Status: ACTIVE | Noted: 2021-02-05

## 2022-03-20 PROBLEM — R03.0 ELEVATED BP WITHOUT DIAGNOSIS OF HYPERTENSION: Status: ACTIVE | Noted: 2021-02-05

## 2022-03-20 PROBLEM — E03.8 SUBCLINICAL HYPOTHYROIDISM: Status: ACTIVE | Noted: 2021-02-08

## 2022-03-20 PROBLEM — R07.9 CHEST PAIN: Status: ACTIVE | Noted: 2021-02-05

## 2022-04-21 PROBLEM — F41.1 GENERALIZED ANXIETY DISORDER: Status: ACTIVE | Noted: 2022-04-21

## 2022-04-22 ENCOUNTER — OFFICE VISIT (OUTPATIENT)
Dept: PRIMARY CARE CLINIC | Age: 47
End: 2022-04-22
Payer: COMMERCIAL

## 2022-04-22 VITALS
HEIGHT: 66 IN | HEART RATE: 72 BPM | RESPIRATION RATE: 20 BRPM | BODY MASS INDEX: 46.86 KG/M2 | SYSTOLIC BLOOD PRESSURE: 135 MMHG | DIASTOLIC BLOOD PRESSURE: 87 MMHG | WEIGHT: 291.6 LBS | TEMPERATURE: 97.1 F | OXYGEN SATURATION: 99 %

## 2022-04-22 DIAGNOSIS — Z12.11 SCREENING FOR MALIGNANT NEOPLASM OF COLON: ICD-10-CM

## 2022-04-22 DIAGNOSIS — M62.830 BACK MUSCLE SPASM: ICD-10-CM

## 2022-04-22 DIAGNOSIS — E66.01 MORBID OBESITY WITH BMI OF 45.0-49.9, ADULT (HCC): ICD-10-CM

## 2022-04-22 DIAGNOSIS — K21.9 GASTROESOPHAGEAL REFLUX DISEASE WITHOUT ESOPHAGITIS: ICD-10-CM

## 2022-04-22 DIAGNOSIS — F41.1 GENERALIZED ANXIETY DISORDER: Primary | ICD-10-CM

## 2022-04-22 DIAGNOSIS — R73.03 PRE-DIABETES: ICD-10-CM

## 2022-04-22 PROCEDURE — 99214 OFFICE O/P EST MOD 30 MIN: CPT | Performed by: FAMILY MEDICINE

## 2022-04-22 RX ORDER — CYCLOBENZAPRINE HCL 5 MG
5 TABLET ORAL
Qty: 40 TABLET | Refills: 0 | Status: SHIPPED | OUTPATIENT
Start: 2022-04-22

## 2022-04-22 NOTE — PATIENT INSTRUCTIONS
Gastroesophageal Reflux Disease (GERD): Care Instructions  Overview     Gastroesophageal reflux disease (GERD) is the backward flow of stomach acid into the esophagus. The esophagus is the tube that leads from your throat to your stomach. A one-way valve prevents the stomach acid from backing up into this tube. But when you have GERD, this valve does not close tightly enough. This can also cause pain and swelling in your esophagus. (This is called esophagitis.)  If you have mild GERD symptoms including heartburn, you may be able to control the problem with antacids or over-the-counter medicine. You can also make lifestyle changes to help reduce your symptoms. These include changing your diet and eating habits, such as not eating late at night and losing weight. Follow-up care is a key part of your treatment and safety. Be sure to make and go to all appointments, and call your doctor if you are having problems. It's also a good idea to know your test results and keep a list of the medicines you take. How can you care for yourself at home? · Take your medicines exactly as prescribed. Call your doctor if you think you are having a problem with your medicine. · Your doctor may recommend over-the-counter medicine. For mild or occasional indigestion, antacids, such as Tums, Gaviscon, Mylanta, or Maalox, may help. Your doctor also may recommend over-the-counter acid reducers, such as famotidine (Pepcid AC), cimetidine (Tagamet HB), or omeprazole (Prilosec). Read and follow all instructions on the label. If you use these medicines often, talk with your doctor. · Change your eating habits. ? It's best to eat several small meals instead of two or three large meals. ? After you eat, wait 2 to 3 hours before you lie down. ? Avoid foods that make your symptoms worse.  These may include chocolate, mint, alcohol, pepper, spicy foods, high-fat foods, or drinks with caffeine in them, such as tea, coffee, armen, or energy drinks. If your symptoms are worse after you eat a certain food, you may want to stop eating it to see if your symptoms get better. · Do not smoke or chew tobacco. Smoking can make GERD worse. If you need help quitting, talk to your doctor about stop-smoking programs and medicines. These can increase your chances of quitting for good. · If you have GERD symptoms at night, raise the head of your bed 6 to 8 inches by putting the frame on blocks or placing a foam wedge under the head of your mattress. (Adding extra pillows does not work.)  · Do not wear tight clothing around your middle. · Lose weight if you need to. Losing just 5 to 10 pounds can help. When should you call for help? Call your doctor now or seek immediate medical care if:    · You have new or different belly pain.     · Your stools are black and tarlike or have streaks of blood. Watch closely for changes in your health, and be sure to contact your doctor if:    · Your symptoms have not improved after 2 days.     · Food seems to catch in your throat or chest.   Where can you learn more? Go to http://www.gray.com/  Enter W416 in the search box to learn more about \"Gastroesophageal Reflux Disease (GERD): Care Instructions. \"  Current as of: September 8, 2021               Content Version: 13.2  © 2006-2022 [employer]. Care instructions adapted under license by [employer] (which disclaims liability or warranty for this information). If you have questions about a medical condition or this instruction, always ask your healthcare professional. [place] [address] any warranty or liability for your use of this information.

## 2022-04-22 NOTE — PROGRESS NOTES
1. \"Have you been to the ER, urgent care clinic since your last visit? Hospitalized since your last visit? \" No    2. \"Have you seen or consulted any other health care providers outside of the [address] [place] [address] since your last visit? \" No     3. For patients aged 39-70: Has the patient had a colonoscopy / FIT/ Cologuard? No      If the patient is female:    4. For patients aged 41-77: Has the patient had a mammogram within the past 2 years? No      5. For patients aged 21-65: Has the patient had a pap smear? Yes - Care Gap present.  Most recent result on file  Visit Vitals  /87 (BP 1 Location: Left upper arm, BP Patient Position: Sitting, BP Cuff Size: Large adult)   Pulse 72   Temp 97.1 °F (36.2 °C) (Temporal)   Resp 20   Ht 5' 6\" (1.676 m)   Wt 291 lb 9.6 oz (132.3 kg)   SpO2 99%   BMI 47.07 kg/m²     Chief Complaint   Patient presents with    Follow-up

## 2022-04-22 NOTE — PROGRESS NOTES
HPI     Chief Complaint   Patient presents with    Follow-up       HPI:  Sara Yen is a 40 y.o. female with a history of COURTNEY, GERD, Obesity, Prediabetes. COURTNEY: Last visit I restarted patient's Celexa due to increased stress and tension for months. She's noticed improved mood, decreased agitation and \"I feel like myself again\". No palpitations, HI/SI. GERD:  Last visit, I started protonix 20mg along with a GERD friendly diet. Patient has no reflux syptoms currently. Morbid Obesity: Patient has been more mindful of diet and trying to be more active. Weight is down and she is happy about that. Weight Metrics 4/22/2022 2/24/2022 5/20/2021 3/4/2021 2/5/2021 3/9/2014   Weight 291 lb 9.6 oz 292 lb 6.4 oz 289 lb 3.2 oz 287 lb 289 lb 12.8 oz 250 lb   BMI 47.07 kg/m2 47.19 kg/m2 46.68 kg/m2 46.32 kg/m2 46.77 kg/m2 40.37 kg/m2     Prediabetes: Last A1c below. No polydipsia reported. Working on lifestyle changes. Lab Results   Component Value Date/Time    Hemoglobin A1c 6.1 (H) 02/05/2021 02:39 PM      Intermittent back spasms: occur infrequently and improve with flexiril 5mg prn. Last scrip was Spring 2021 and lasted all year. Review of Systems   Cardiovascular: Negative for chest pain and palpitations. Gastrointestinal: Positive for heartburn. Negative for abdominal pain. Musculoskeletal: Positive for back pain and myalgias. Endo/Heme/Allergies: Negative for polydipsia. Does not bruise/bleed easily. Psychiatric/Behavioral: Negative for depression and suicidal ideas. The patient is nervous/anxious. Reviewed PmHx, FmHx, SocHx as well as meds and allergies, updated and dated in the chart. Current Outpatient Medications on File Prior to Visit   Medication Sig Dispense Refill    citalopram (CELEXA) 20 mg tablet Take 1 Tablet by mouth daily. 30 Tablet 5    pantoprazole (PROTONIX) 20 mg tablet Take 1 Tablet by mouth daily.  30 Tablet 5    semaglutide (Ozempic) 0.25 mg or 0.5 mg/dose (2 mg/1.5 ml) subq pen 0.25 mg by SubCUTAneous route every seven (7) days. 1 Box 2    magnesium 250 mg tab Take  by mouth.  naproxen (NAPROSYN) 375 mg tablet Take 1 Tablet by mouth two (2) times daily as needed for Pain. 30 Tablet 0    cholecalciferol (VITAMIN D3) (2,000 UNITS /50 MCG) cap capsule Take  by mouth daily.  fish oil-omega-3 fatty acids 340-1,000 mg capsule Take 1 Cap by mouth daily.  therapeutic multivitamin-minerals (THERAGRAN-M) tablet Take 1 Tab by mouth daily.  cyanocobalamin (Vitamin B-12) 100 mcg tablet Take 100 mcg by mouth daily.  docusate sodium (COLACE) 50 mg capsule Take 50 mg by mouth daily.  coenzyme q10 10 mg cap Take 10 mg by mouth daily.  loratadine (CLARITIN REDITABS) 10 mg dissolvable tablet Take 10 mg by mouth daily.  [DISCONTINUED] cyclobenzaprine (FLEXERIL) 5 mg tablet Take 1 Tablet by mouth two (2) times daily as needed for Muscle Spasm(s). 40 Tablet 0     No current facility-administered medications on file prior to visit. Requested Prescriptions     Signed Prescriptions Disp Refills    cyclobenzaprine (FLEXERIL) 5 mg tablet 40 Tablet 0     Sig: Take 1 Tablet by mouth two (2) times daily as needed for Muscle Spasm(s). Objective     Visit Vitals  /87 (BP 1 Location: Left upper arm, BP Patient Position: Sitting, BP Cuff Size: Large adult)   Pulse 72   Temp 97.1 °F (36.2 °C) (Temporal)   Resp 20   Ht 5' 6\" (1.676 m)   Wt 291 lb 9.6 oz (132.3 kg)   SpO2 99%   BMI 47.07 kg/m²     Physical Exam  Vitals and nursing note reviewed. Constitutional:       Appearance: Normal appearance. HENT:      Head: Normocephalic and atraumatic. Right Ear: External ear normal.      Left Ear: External ear normal.   Eyes:      Extraocular Movements: Extraocular movements intact. Conjunctiva/sclera: Conjunctivae normal.   Cardiovascular:      Rate and Rhythm: Normal rate and regular rhythm. Heart sounds: Normal heart sounds. Pulmonary:      Effort: Pulmonary effort is normal. No respiratory distress. Breath sounds: Normal breath sounds. Neurological:      General: No focal deficit present. Mental Status: She is alert and oriented to person, place, and time. Psychiatric:         Mood and Affect: Mood normal.         Behavior: Behavior normal.      Comments: Good eye contact. Speech linear and non pressured. Assessment and Plan     Diagnoses and all orders for this visit:    1. Generalized anxiety disorder  Comments:  Improved on Celexa 20mg. Continue. 2. Gastroesophageal reflux disease without esophagitis  Comments:  Stable on protonix. GERD diet encouraged. She will schedule colonoscopy/endoscopy if recommended, with GI. 3. Morbid obesity with BMI of 45.0-49.9, adult ([facility] [address]  Comments:  Continue lifestyle changes. 30 minutes of exercise at least 4 times a week. 4. Pre-diabetes  Comments:  Continue lifestyle changes and exercise. 5. Back muscle spasm  Comments:  Flexiril used infrequently but helps. Refills given. Orders:  -     cyclobenzaprine (FLEXERIL) 5 mg tablet; Take 1 Tablet by mouth two (2) times daily as needed for Muscle Spasm(s). 6. Screening for malignant neoplasm of colon  -     REFERRAL TO GASTROENTEROLOGY           Follow-up and Dispositions    Return in about 6 months (around 10/22/2022) for annual physical, chronic follow up. I have discussed the diagnosis with the patient and the intended plan as seen in the above orders. The patient has received an after-visit summary and questions were answered concerning future plans. I have discussed medication side effects and warnings with the patient as well.       Julio C Reynoso MD  [address] [place] [address]

## 2022-07-11 ENCOUNTER — NURSE TRIAGE (OUTPATIENT)
Dept: OTHER | Facility: CLINIC | Age: 47
End: 2022-07-11

## 2022-07-11 NOTE — TELEPHONE ENCOUNTER
Received call from ALFRED Alvarez at [facility] with Red Flag Complaint. Subjective: Caller states \"today is day 5. I have an awful cough, rattle in my chest and it won't go away\"     Current Symptoms: cough, congestion, SOB (when coughing), dizziness (when coughing)    Onset: 5 days ago; unchanged      Pain Severity: 0/10; Temperature: denies     What has been tried: Benadryl, Tylenol, Motrin     LMP: 1 week ago Pregnant: No    Recommended disposition: Go to [facility]    Care advice provided, patient verbalizes understanding; denies any other questions or concerns; instructed to call back for any new or worsening symptoms. Patient/Caller agrees with recommended disposition; writer provided warm transfer to [facility] at [facility] for appointment scheduling    Attention Provider: Thank you for allowing me to participate in the care of your patient. The patient was connected to triage in response to information provided to the [facility]. Please do not respond through this encounter as the response is not directed to a shared pool.     Reason for Disposition   MILD difficulty breathing (e.g., minimal/no SOB at rest, SOB with walking, pulse <100) and still present when not coughing    Protocols used: COUGH-ADULT-OH

## 2022-09-29 DIAGNOSIS — K21.9 GASTROESOPHAGEAL REFLUX DISEASE WITHOUT ESOPHAGITIS: ICD-10-CM

## 2022-09-29 DIAGNOSIS — F41.1 GAD (GENERALIZED ANXIETY DISORDER): ICD-10-CM

## 2022-09-29 RX ORDER — PANTOPRAZOLE SODIUM 20 MG/1
20 TABLET, DELAYED RELEASE ORAL
Qty: 30 TABLET | Refills: 2 | Status: SHIPPED | OUTPATIENT
Start: 2022-09-29

## 2022-09-29 RX ORDER — CITALOPRAM 20 MG/1
TABLET, FILM COATED ORAL
Qty: 30 TABLET | Refills: 5 | Status: SHIPPED | OUTPATIENT
Start: 2022-09-29

## 2023-05-16 ENCOUNTER — TELEMEDICINE (OUTPATIENT)
Dept: PRIMARY CARE CLINIC | Facility: CLINIC | Age: 48
End: 2023-05-16

## 2023-05-16 DIAGNOSIS — F41.1 GENERALIZED ANXIETY DISORDER: Primary | ICD-10-CM

## 2023-05-16 DIAGNOSIS — M62.830 MUSCLE SPASM OF BACK: ICD-10-CM

## 2023-05-16 PROCEDURE — 99213 OFFICE O/P EST LOW 20 MIN: CPT | Performed by: FAMILY MEDICINE

## 2023-05-16 RX ORDER — CITALOPRAM 20 MG/1
20 TABLET ORAL DAILY
Qty: 90 TABLET | Refills: 1 | Status: SHIPPED | OUTPATIENT
Start: 2023-05-16

## 2023-05-16 RX ORDER — CYCLOBENZAPRINE HCL 5 MG
5 TABLET ORAL 2 TIMES DAILY PRN
Qty: 60 TABLET | Refills: 3 | Status: SHIPPED | OUTPATIENT
Start: 2023-05-16

## 2023-05-16 SDOH — ECONOMIC STABILITY: FOOD INSECURITY: WITHIN THE PAST 12 MONTHS, THE FOOD YOU BOUGHT JUST DIDN'T LAST AND YOU DIDN'T HAVE MONEY TO GET MORE.: NEVER TRUE

## 2023-05-16 SDOH — ECONOMIC STABILITY: INCOME INSECURITY: HOW HARD IS IT FOR YOU TO PAY FOR THE VERY BASICS LIKE FOOD, HOUSING, MEDICAL CARE, AND HEATING?: NOT VERY HARD

## 2023-05-16 SDOH — ECONOMIC STABILITY: FOOD INSECURITY: WITHIN THE PAST 12 MONTHS, YOU WORRIED THAT YOUR FOOD WOULD RUN OUT BEFORE YOU GOT MONEY TO BUY MORE.: NEVER TRUE

## 2023-05-16 SDOH — ECONOMIC STABILITY: TRANSPORTATION INSECURITY
IN THE PAST 12 MONTHS, HAS LACK OF TRANSPORTATION KEPT YOU FROM MEETINGS, WORK, OR FROM GETTING THINGS NEEDED FOR DAILY LIVING?: NO

## 2023-05-16 SDOH — ECONOMIC STABILITY: HOUSING INSECURITY
IN THE LAST 12 MONTHS, WAS THERE A TIME WHEN YOU DID NOT HAVE A STEADY PLACE TO SLEEP OR SLEPT IN A SHELTER (INCLUDING NOW)?: NO

## 2023-05-16 ASSESSMENT — ENCOUNTER SYMPTOMS
COUGH: 0
SHORTNESS OF BREATH: 0

## 2023-05-16 ASSESSMENT — PATIENT HEALTH QUESTIONNAIRE - PHQ9
2. FEELING DOWN, DEPRESSED OR HOPELESS: 0
SUM OF ALL RESPONSES TO PHQ QUESTIONS 1-9: 0
1. LITTLE INTEREST OR PLEASURE IN DOING THINGS: 0
SUM OF ALL RESPONSES TO PHQ QUESTIONS 1-9: 0
SUM OF ALL RESPONSES TO PHQ9 QUESTIONS 1 & 2: 0

## 2023-05-16 NOTE — PROGRESS NOTES
Identified Patient with two Patient identifiers(name and ). 1. Have you been to the ER, urgent care clinic since your last visit? Hospitalized since your last visit? No    2. Have you seen or consulted any other health care providers outside of the [address] [place] [address] since your last visit? No     3. For patients aged 39-70: Has the patient had a colonoscopy / FIT/ Cologuard? No    If the patient is female:    4. For patients aged 41-77: Has the patient had a mammogram within the past 2 years? Yes-No Care Gap Present      5. For patients aged 21-65: Has the patient had a pap smear? Yes-No Care Gap Present   There were no vitals taken for this visit.           Health Maintenance Due   Topic Date Due    COVID-19 Vaccine (1) Never done    Pneumococcal 0-64 years Vaccine (1 - PCV) Never done    Shingles vaccine (1 of 2) Never done    Colorectal Cancer Screen  Never done    A1C test (Diabetic or Prediabetic)  2022    Depression Screen  2023

## 2023-05-16 NOTE — PROGRESS NOTES
Iris Cortes (:  1975) is a Established patient, presenting virtually for evaluation of the following:    Assessment & Plan   Below is the assessment and plan developed based on review of pertinent history, physical exam, labs, studies, and medications. 1. Generalized anxiety disorder  Comments:  Controlled on celexa 20mg daily. Orders:  -     citalopram (CELEXA) 20 MG tablet; Take 1 tablet by mouth daily, Disp-90 tablet, R-1Normal  2. Muscle spasm of back  Comments:  Still doing well on flexiril prn, uses sparingly. Other meds don't help as much. Orders:  -     cyclobenzaprine (FLEXERIL) 5 MG tablet; Take 1 tablet by mouth 2 times daily as needed for Muscle spasms, Disp-60 tablet, R-3Normal    No follow-ups on file. Subjective   KATERINA: does well on celexa 20mg. Denies HI/SI. Muscle spasms: does well on flexiril prn. Review of Systems   Respiratory:  Negative for cough and shortness of breath. Cardiovascular:  Negative for chest pain and palpitations. Psychiatric/Behavioral:  Negative for suicidal ideas. The patient is not nervous/anxious. Objective   Patient-Reported Vitals  No data recorded     Physical Exam  Nursing note reviewed. Constitutional:       Appearance: She is not ill-appearing or toxic-appearing. Pulmonary:      Effort: Pulmonary effort is normal. No respiratory distress. Neurological:      Mental Status: She is oriented to person, place, and time. Psychiatric:         Mood and Affect: Mood normal.         Behavior: Behavior normal.         Iris Cortes, was evaluated through a synchronous (real-time) audio-video encounter. The patient (or guardian if applicable) is aware that this is a billable service, which includes applicable co-pays. This Virtual Visit was conducted with patient's (and/or legal guardian's) consent. Patient identification was verified, and a caregiver was present when appropriate.    The patient was located at Other: non

## 2024-01-30 ENCOUNTER — OFFICE VISIT (OUTPATIENT)
Dept: PRIMARY CARE CLINIC | Facility: CLINIC | Age: 49
End: 2024-01-30

## 2024-01-30 VITALS
TEMPERATURE: 98.6 F | RESPIRATION RATE: 16 BRPM | WEIGHT: 291 LBS | HEIGHT: 66 IN | HEART RATE: 72 BPM | DIASTOLIC BLOOD PRESSURE: 79 MMHG | BODY MASS INDEX: 46.77 KG/M2 | OXYGEN SATURATION: 98 % | SYSTOLIC BLOOD PRESSURE: 133 MMHG

## 2024-01-30 DIAGNOSIS — R10.11 COLICKY RIGHT UPPER QUADRANT PAIN: ICD-10-CM

## 2024-01-30 DIAGNOSIS — F41.1 GENERALIZED ANXIETY DISORDER: ICD-10-CM

## 2024-01-30 DIAGNOSIS — R10.11 COLICKY RIGHT UPPER QUADRANT PAIN: Primary | ICD-10-CM

## 2024-01-30 PROBLEM — R03.0 ELEVATED BP WITHOUT DIAGNOSIS OF HYPERTENSION: Status: ACTIVE | Noted: 2021-02-05

## 2024-01-30 PROCEDURE — 99213 OFFICE O/P EST LOW 20 MIN: CPT | Performed by: PREVENTIVE MEDICINE

## 2024-01-30 RX ORDER — CITALOPRAM 20 MG/1
20 TABLET ORAL DAILY
Qty: 90 TABLET | Refills: 1 | Status: SHIPPED | OUTPATIENT
Start: 2024-01-30

## 2024-01-30 ASSESSMENT — ENCOUNTER SYMPTOMS
DIARRHEA: 1
ABDOMINAL PAIN: 1
NAUSEA: 1
ABDOMINAL DISTENTION: 1
VOMITING: 0
BLOOD IN STOOL: 0

## 2024-01-30 ASSESSMENT — PATIENT HEALTH QUESTIONNAIRE - PHQ9
SUM OF ALL RESPONSES TO PHQ QUESTIONS 1-9: 0
SUM OF ALL RESPONSES TO PHQ9 QUESTIONS 1 & 2: 0
2. FEELING DOWN, DEPRESSED OR HOPELESS: 0
SUM OF ALL RESPONSES TO PHQ QUESTIONS 1-9: 0
1. LITTLE INTEREST OR PLEASURE IN DOING THINGS: 0

## 2024-01-30 NOTE — PROGRESS NOTES
Yoly Layne is a 48 y.o. female who was seen in clinic today (1/30/2024).    Assessment & Plan:   Below is the assessment and plan developed based on review of pertinent history, physical exam, labs, studies, and medications.    1. Generalized anxiety disorder  Comments:  Controlled on celexa 20mg daily.   The following orders have not been finalized:  Orders:  -     citalopram (CELEXA) 20 MG tablet      There are no Patient Instructions on file for this visit.     No follow-ups on file.    Subjective:   Yoly was seen today for Other (Stomach pain/ Right  side)  She is concerned that she may be having gallbladder attacks. She has had fatty food intolerance.  Positive lactose intolerance. Her stools have been lighter in color. She has had some diarrhea. No hematochezia. She states that this has been going on since the beginning of November, off and on.    She is the manager in a dental office.     She says that the Citalopram has helped her considerably  around PMS time; she is weepy and impatient without it. She has been out of it for a couple of weeks as her doctor, Dr. Contreras left and she did not have another physician before she ran out of medications.       Review of Systems   Gastrointestinal:  Positive for abdominal distention, abdominal pain, diarrhea and nausea. Negative for blood in stool and vomiting.        A little bit of nausea, but not persistent.    Genitourinary: Negative.    Psychiatric/Behavioral:  Positive for dysphoric mood.           Objective:     Vitals:    01/30/24 1117   BP: 133/79   Site: Right Upper Arm   Position: Sitting   Cuff Size: Large Adult   Pulse: 72   Resp: 16   Temp: 98.6 °F (37 °C)   TempSrc: Oral   SpO2: 98%   Weight: 132 kg (291 lb)   Height: 1.676 m (5' 6\")      Body mass index is 46.97 kg/m².     Physical Exam  Constitutional:       General: She is not in acute distress.     Appearance: Normal appearance.   Cardiovascular:      Rate and Rhythm: Normal rate and

## 2024-01-30 NOTE — PROGRESS NOTES
\"Have you been to the ER, urgent care clinic since your last visit?  Hospitalized since your last visit?\"    NO    “Have you seen or consulted any other health care providers outside of [facility] since your last visit?”    NO    “Have you had a colorectal cancer screening such as a colonoscopy/FIT/Cologuard?    NO

## 2024-01-31 LAB
ALBUMIN SERPL-MCNC: 4.2 G/DL (ref 3.9–4.9)
ALBUMIN/GLOB SERPL: 1.5 {RATIO} (ref 1.2–2.2)
ALP SERPL-CCNC: 56 IU/L (ref 44–121)
ALT SERPL-CCNC: 13 IU/L (ref 0–32)
AMYLASE SERPL-CCNC: 56 U/L (ref 31–110)
AST SERPL-CCNC: 15 IU/L (ref 0–40)
BASOPHILS # BLD AUTO: 0.1 X10E3/UL (ref 0–0.2)
BASOPHILS NFR BLD AUTO: 1 %
BILIRUB SERPL-MCNC: 1.5 MG/DL (ref 0–1.2)
BUN SERPL-MCNC: 7 MG/DL (ref 6–24)
BUN/CREAT SERPL: 9 (ref 9–23)
CALCIUM SERPL-MCNC: 9.3 MG/DL (ref 8.7–10.2)
CHLORIDE SERPL-SCNC: 104 MMOL/L (ref 96–106)
CO2 SERPL-SCNC: 22 MMOL/L (ref 20–29)
CREAT SERPL-MCNC: 0.76 MG/DL (ref 0.57–1)
EGFRCR SERPLBLD CKD-EPI 2021: 97 ML/MIN/1.73
EOSINOPHIL # BLD AUTO: 0.2 X10E3/UL (ref 0–0.4)
EOSINOPHIL NFR BLD AUTO: 2 %
ERYTHROCYTE [DISTWIDTH] IN BLOOD BY AUTOMATED COUNT: 18.5 % (ref 11.7–15.4)
GLOBULIN SER CALC-MCNC: 2.8 G/DL (ref 1.5–4.5)
GLUCOSE SERPL-MCNC: 81 MG/DL (ref 70–99)
HCT VFR BLD AUTO: 38.3 % (ref 34–46.6)
HGB BLD-MCNC: 12.4 G/DL (ref 11.1–15.9)
IMM GRANULOCYTES # BLD AUTO: 0 X10E3/UL (ref 0–0.1)
IMM GRANULOCYTES NFR BLD AUTO: 0 %
LIPASE SERPL-CCNC: 53 U/L (ref 14–72)
LYMPHOCYTES # BLD AUTO: 3.6 X10E3/UL (ref 0.7–3.1)
LYMPHOCYTES NFR BLD AUTO: 44 %
MCH RBC QN AUTO: 21.4 PG (ref 26.6–33)
MCHC RBC AUTO-ENTMCNC: 32.4 G/DL (ref 31.5–35.7)
MCV RBC AUTO: 66 FL (ref 79–97)
MONOCYTES # BLD AUTO: 0.6 X10E3/UL (ref 0.1–0.9)
MONOCYTES NFR BLD AUTO: 8 %
NEUTROPHILS # BLD AUTO: 3.6 X10E3/UL (ref 1.4–7)
NEUTROPHILS NFR BLD AUTO: 45 %
PLATELET # BLD AUTO: 278 X10E3/UL (ref 150–450)
POTASSIUM SERPL-SCNC: 4 MMOL/L (ref 3.5–5.2)
PROT SERPL-MCNC: 7 G/DL (ref 6–8.5)
RBC # BLD AUTO: 5.8 X10E6/UL (ref 3.77–5.28)
SODIUM SERPL-SCNC: 143 MMOL/L (ref 134–144)
WBC # BLD AUTO: 8 X10E3/UL (ref 3.4–10.8)

## 2024-02-05 DIAGNOSIS — D53.9 ANEMIA ASSOCIATED WITH NUTRITIONAL DEFICIENCY: ICD-10-CM

## 2024-02-12 ENCOUNTER — HOSPITAL ENCOUNTER (OUTPATIENT)
Facility: HOSPITAL | Age: 49
Discharge: HOME | End: 2024-02-15
Attending: PREVENTIVE MEDICINE

## 2024-02-12 DIAGNOSIS — R10.11 COLICKY RIGHT UPPER QUADRANT PAIN: ICD-10-CM

## 2024-02-12 PROCEDURE — 76705 ECHO EXAM OF ABDOMEN: CPT

## 2024-02-19 ENCOUNTER — OFFICE VISIT (OUTPATIENT)
Dept: PRIMARY CARE CLINIC | Facility: CLINIC | Age: 49
End: 2024-02-19

## 2024-02-19 VITALS
DIASTOLIC BLOOD PRESSURE: 75 MMHG | BODY MASS INDEX: 45.35 KG/M2 | WEIGHT: 282.2 LBS | TEMPERATURE: 98.6 F | HEART RATE: 68 BPM | OXYGEN SATURATION: 98 % | RESPIRATION RATE: 18 BRPM | HEIGHT: 66 IN | SYSTOLIC BLOOD PRESSURE: 116 MMHG

## 2024-02-19 DIAGNOSIS — K76.0 NONALCOHOLIC FATTY LIVER DISEASE WITHOUT NONALCOHOLIC STEATOHEPATITIS (NASH): ICD-10-CM

## 2024-02-19 DIAGNOSIS — R73.03 PREDIABETES: ICD-10-CM

## 2024-02-19 DIAGNOSIS — E66.01 MORBID (SEVERE) OBESITY DUE TO EXCESS CALORIES (HCC): Primary | ICD-10-CM

## 2024-02-19 PROCEDURE — 99214 OFFICE O/P EST MOD 30 MIN: CPT | Performed by: PREVENTIVE MEDICINE

## 2024-02-19 ASSESSMENT — ENCOUNTER SYMPTOMS
GASTROINTESTINAL NEGATIVE: 1
RESPIRATORY NEGATIVE: 1

## 2024-02-19 NOTE — PROGRESS NOTES
\"Have you been to the ER, urgent care clinic since your last visit?  Hospitalized since your last visit?\"    NO    “Have you seen or consulted any other health care providers outside of [facility] since your last visit?”    NO    “Have you had a colorectal cancer screening such as a colonoscopy/FIT/Cologuard?    NO

## 2024-02-19 NOTE — PATIENT INSTRUCTIONS
Jame Barajas 6 Week Body Makeover by Randal - get DVD version on [employer]. Make certain you buy a complete kit, answer the questions, determine body type and follow that plan.  Prep meals for 2 weeks at a time and put them in individual plastic containers so you are ready to go. Track what you ate and how much you lost. If you lost 5 lbs one week and only 2 the next, repeat the eating plan that got the most weight off!     Take 1 Kristen-Colace every other evening. with large glass of cold water followed by a hot beverage.     2 Cups of White tea daily for weight loss.     Sublingual B12 5,000 mcg at least  once weekly.    Think about physical activities that you would like to learn that involve the use of all 4 limbs.

## 2024-02-19 NOTE — PROGRESS NOTES
Yoly Layne is a 48 y.o. female who was seen in clinic today (2/19/2024).    Assessment & Plan:   Below is the assessment and plan developed based on review of pertinent history, physical exam, labs, studies, and medications.    1. Morbid (severe) obesity due to excess calories (HCC)  -     Lipid Panel  -     Hemoglobin A1C  2. Nonalcoholic fatty liver disease without nonalcoholic steatohepatitis (TERESA)  -     Lipid Panel  3. Prediabetes  -     Hemoglobin A1C      Patient Instructions   Jame Barajas 6 Week Body Makeover by [employer] - get DVD version on [employer]. Make certain you buy a complete kit, answer the questions, determine body type and follow that plan.  Prep meals for 2 weeks at a time and put them in individual plastic containers so you are ready to go. Track what you ate and how much you lost. If you lost 5 lbs one week and only 2 the next, repeat the eating plan that got the most weight off!     Take 1 Kristen-Colace every other evening. with large glass of cold water followed by a hot beverage.     2 Cups of White tea daily for weight loss.     Sublingual B12 5,000 mcg at least  once weekly.    Think about physical activities that you would like to learn that involve the use of all 4 limbs.      No follow-ups on file.    Subjective:   Yoly was seen today for Follow-up (US/ Labs)   We went over her labs, explaining TERESA vs. NAFLD. We discussed weightloss as a treatment. She has not had abdominal pain since her last visit here. She has lost 9# since her last OV, she is not entirely sure why.      Review of Systems   Respiratory: Negative.     Cardiovascular: Negative.    Gastrointestinal: Negative.           Objective:     Vitals:    02/19/24 0758   BP: 116/75   Site: Right Upper Arm   Position: Sitting   Cuff Size: Large Adult   Pulse: 68   Resp: 18   Temp: 98.6 °F (37 °C)   TempSrc: Oral   SpO2: 98%   Weight: 128 kg (282 lb 3.2 oz)   Height: 1.676 m (5' 6\")      Body mass index is 45.55 kg/m².

## 2024-02-20 LAB
CHOLEST SERPL-MCNC: 192 MG/DL (ref 100–199)
FERRITIN SERPL-MCNC: 56 NG/ML (ref 15–150)
HBA1C MFR BLD: 6.1 % (ref 4.8–5.6)
HDLC SERPL-MCNC: 36 MG/DL
IRON SATN MFR SERPL: 16 % (ref 15–55)
IRON SERPL-MCNC: 67 UG/DL (ref 27–159)
LDLC SERPL CALC-MCNC: 141 MG/DL (ref 0–99)
TIBC SERPL-MCNC: 422 UG/DL (ref 250–450)
TRIGL SERPL-MCNC: 84 MG/DL (ref 0–149)
UIBC SERPL-MCNC: 355 UG/DL (ref 131–425)
VLDLC SERPL CALC-MCNC: 15 MG/DL (ref 5–40)

## 2024-08-08 ENCOUNTER — OFFICE VISIT (OUTPATIENT)
Dept: PRIMARY CARE CLINIC | Facility: CLINIC | Age: 49
End: 2024-08-08

## 2024-08-08 VITALS
WEIGHT: 280 LBS | DIASTOLIC BLOOD PRESSURE: 78 MMHG | TEMPERATURE: 98.2 F | HEIGHT: 66 IN | RESPIRATION RATE: 16 BRPM | OXYGEN SATURATION: 98 % | HEART RATE: 76 BPM | SYSTOLIC BLOOD PRESSURE: 136 MMHG | BODY MASS INDEX: 45 KG/M2

## 2024-08-08 DIAGNOSIS — E66.01 MORBID OBESITY WITH BMI OF 45.0-49.9, ADULT (HCC): Primary | ICD-10-CM

## 2024-08-08 DIAGNOSIS — F41.1 GENERALIZED ANXIETY DISORDER: ICD-10-CM

## 2024-08-08 PROCEDURE — 99214 OFFICE O/P EST MOD 30 MIN: CPT | Performed by: FAMILY MEDICINE

## 2024-08-08 RX ORDER — PHENTERMINE HYDROCHLORIDE 37.5 MG/1
37.5 TABLET ORAL
Qty: 30 TABLET | Refills: 0 | Status: SHIPPED | OUTPATIENT
Start: 2024-08-08 | End: 2024-09-07

## 2024-08-08 RX ORDER — CITALOPRAM 20 MG/1
20 TABLET ORAL DAILY
Qty: 90 TABLET | Refills: 3 | Status: SHIPPED | OUTPATIENT
Start: 2024-08-08

## 2024-08-08 SDOH — ECONOMIC STABILITY: FOOD INSECURITY: WITHIN THE PAST 12 MONTHS, YOU WORRIED THAT YOUR FOOD WOULD RUN OUT BEFORE YOU GOT MONEY TO BUY MORE.: NEVER TRUE

## 2024-08-08 SDOH — ECONOMIC STABILITY: FOOD INSECURITY: WITHIN THE PAST 12 MONTHS, THE FOOD YOU BOUGHT JUST DIDN'T LAST AND YOU DIDN'T HAVE MONEY TO GET MORE.: NEVER TRUE

## 2024-08-08 SDOH — ECONOMIC STABILITY: INCOME INSECURITY: HOW HARD IS IT FOR YOU TO PAY FOR THE VERY BASICS LIKE FOOD, HOUSING, MEDICAL CARE, AND HEATING?: NOT HARD AT ALL

## 2024-08-08 NOTE — PROGRESS NOTES
Subjective  Chief Complaint   Patient presents with    Medication Refill     Establish      HPI:  Yoly Layne is a 48 y.o. female.    Anxiety-secondary to menstrual cycle, well-controlled on citalopram.    Weight-patient is slowly been losing weight over the last 6 months.  She reports she actually got to to 260 pounds, however stress caused her to gain some of the weight back.  Patient is interested in medication to help with cravings.    Past Medical History:   Diagnosis Date    Headache      Current Outpatient Medications on File Prior to Visit   Medication Sig Dispense Refill    loratadine (CLARITIN REDITABS) 10 MG dissolvable tablet Take 1 tablet by mouth daily      pantoprazole (PROTONIX) 20 MG tablet Take 1 tablet by mouth daily as needed       No current facility-administered medications on file prior to visit.     Allergies   Allergen Reactions    Prednisone Anxiety       Objective  Vitals:    08/08/24 0742   BP: 136/78   Pulse: 76   Resp: 16   Temp: 98.2 °F (36.8 °C)   SpO2: 98%     Wt Readings from Last 3 Encounters:   08/08/24 127 kg (280 lb)   02/19/24 128 kg (282 lb 3.2 oz)   01/30/24 132 kg (291 lb)     Physical Exam  Constitutional:       Appearance: Normal appearance.   HENT:      Head: Normocephalic and atraumatic.   Cardiovascular:      Rate and Rhythm: Normal rate and regular rhythm.   Pulmonary:      Effort: Pulmonary effort is normal.      Breath sounds: Normal breath sounds.   Neurological:      General: No focal deficit present.      Mental Status: She is alert and oriented to person, place, and time.   Psychiatric:         Mood and Affect: Mood normal.         Behavior: Behavior normal.          Assessment & Plan  1. Morbid obesity with BMI of 45.0-49.9, adult ([facility])  Assessment & Plan:   Patient still losing weight, but has difficulty staying consistent on her calorie restriction.  She is interested in trying medication to help with weight loss.    Nutritional or dietetic assessment

## 2024-08-08 NOTE — PROGRESS NOTES
\"Have you been to the ER, urgent care clinic since your last visit?  Hospitalized since your last visit?\"    NO    “Have you seen or consulted any other health care providers outside of [facility] since your last visit?”    NO    “Have you had a colorectal cancer screening such as a colonoscopy/FIT/Cologuard?    NO    No colonoscopy on file  No cologuard on file  No FIT/FOBT on file   No flexible sigmoidoscopy on file        Have you had a mammogram?”   NO    Date of last Mammogram: 9/17/2021

## 2024-08-08 NOTE — ASSESSMENT & PLAN NOTE
Patient still losing weight, but has difficulty staying consistent on her calorie restriction.  She is interested in trying medication to help with weight loss.    Nutritional or dietetic assessment   Current dietary plan: Calorie restriction  24 hour dietary recall: Achieved calorie deficit  Exercise: Goal is 150 minutes of moderate exertion exercise a week  Length of time dietary changes have been made: Greater than 6 months  Medication Management: I will start phentermine  Previous diets tried: Calorie restriction    Last Weight Metrics:      8/8/2024     7:42 AM 2/19/2024     7:58 AM 1/30/2024    11:17 AM 4/22/2022     2:26 PM 2/24/2022     2:26 PM 5/20/2021     8:43 AM 3/4/2021     9:04 AM   Weight Loss Metrics   Height 5' 6\" 5' 6\" 5' 6\" 5' 6\" 5' 6\" 5' 6\" 5' 6\"   Weight - Scale 280 lbs 282 lbs 3 oz 291 lbs 291 lbs 10 oz 292 lbs 6 oz 289 lbs 3 oz 287 lbs   BMI (Calculated) 45.3 kg/m2 45.6 kg/m2 47.1 kg/m2 47.2 kg/m2 47.3 kg/m2 46.8 kg/m2 46.4 kg/m2        Does the patient have a history or current eating disorder: No  Does the patient have hx of malabsorption syndromes, cholestasis, pregnancy, and/or lactation?: No  Is the patient currently using a GLP1 like Victoza or Ozempic: No

## 2024-09-06 ENCOUNTER — PATIENT MESSAGE (OUTPATIENT)
Dept: PRIMARY CARE CLINIC | Facility: CLINIC | Age: 49
End: 2024-09-06

## 2024-09-06 DIAGNOSIS — E66.01 MORBID OBESITY WITH BMI OF 45.0-49.9, ADULT (HCC): ICD-10-CM

## 2024-09-09 RX ORDER — PHENTERMINE HYDROCHLORIDE 37.5 MG/1
37.5 TABLET ORAL
Qty: 30 TABLET | Refills: 0 | Status: SHIPPED | OUTPATIENT
Start: 2024-09-09 | End: 2024-10-09